# Patient Record
Sex: FEMALE | HISPANIC OR LATINO | Employment: OTHER | URBAN - METROPOLITAN AREA
[De-identification: names, ages, dates, MRNs, and addresses within clinical notes are randomized per-mention and may not be internally consistent; named-entity substitution may affect disease eponyms.]

---

## 2017-12-28 ENCOUNTER — APPOINTMENT (OUTPATIENT)
Dept: CT IMAGING | Facility: CLINIC | Age: 57
End: 2017-12-28
Attending: EMERGENCY MEDICINE

## 2017-12-28 ENCOUNTER — APPOINTMENT (OUTPATIENT)
Dept: ULTRASOUND IMAGING | Facility: CLINIC | Age: 57
End: 2017-12-28
Attending: EMERGENCY MEDICINE

## 2017-12-28 ENCOUNTER — HOSPITAL ENCOUNTER (EMERGENCY)
Facility: CLINIC | Age: 57
Discharge: HOME | End: 2017-12-29
Attending: EMERGENCY MEDICINE | Admitting: EMERGENCY MEDICINE

## 2017-12-28 DIAGNOSIS — R10.13 DYSPEPSIA: ICD-10-CM

## 2017-12-28 LAB
ALBUMIN SERPL-MCNC: 3.8 G/DL (ref 3.4–5)
ALBUMIN UR-MCNC: NEGATIVE MG/DL
ALP SERPL-CCNC: 144 U/L (ref 40–150)
ALT SERPL W P-5'-P-CCNC: 37 U/L (ref 0–50)
ANION GAP SERPL CALCULATED.3IONS-SCNC: 8 MMOL/L (ref 3–14)
APPEARANCE UR: CLEAR
AST SERPL W P-5'-P-CCNC: 19 U/L (ref 0–45)
BACTERIA #/AREA URNS HPF: ABNORMAL /HPF
BASOPHILS # BLD AUTO: 0 10E9/L (ref 0–0.2)
BASOPHILS NFR BLD AUTO: 0.6 %
BILIRUB SERPL-MCNC: 0.5 MG/DL (ref 0.2–1.3)
BILIRUB UR QL STRIP: NEGATIVE
BUN SERPL-MCNC: 16 MG/DL (ref 7–30)
CALCIUM SERPL-MCNC: 9 MG/DL (ref 8.5–10.1)
CHLORIDE SERPL-SCNC: 101 MMOL/L (ref 94–109)
CO2 SERPL-SCNC: 26 MMOL/L (ref 20–32)
COLOR UR AUTO: ABNORMAL
CREAT SERPL-MCNC: 0.57 MG/DL (ref 0.52–1.04)
DIFFERENTIAL METHOD BLD: NORMAL
EOSINOPHIL # BLD AUTO: 0.2 10E9/L (ref 0–0.7)
EOSINOPHIL NFR BLD AUTO: 3.2 %
ERYTHROCYTE [DISTWIDTH] IN BLOOD BY AUTOMATED COUNT: 12.4 % (ref 10–15)
GFR SERPL CREATININE-BSD FRML MDRD: >90 ML/MIN/1.7M2
GLUCOSE SERPL-MCNC: 377 MG/DL (ref 70–99)
GLUCOSE UR STRIP-MCNC: >1000 MG/DL
HCT VFR BLD AUTO: 35.1 % (ref 35–47)
HGB BLD-MCNC: 12 G/DL (ref 11.7–15.7)
HGB UR QL STRIP: NEGATIVE
IMM GRANULOCYTES # BLD: 0 10E9/L (ref 0–0.4)
IMM GRANULOCYTES NFR BLD: 0.2 %
KETONES UR STRIP-MCNC: 10 MG/DL
LEUKOCYTE ESTERASE UR QL STRIP: NEGATIVE
LIPASE SERPL-CCNC: 477 U/L (ref 73–393)
LYMPHOCYTES # BLD AUTO: 2.1 10E9/L (ref 0.8–5.3)
LYMPHOCYTES NFR BLD AUTO: 38.6 %
MCH RBC QN AUTO: 28.4 PG (ref 26.5–33)
MCHC RBC AUTO-ENTMCNC: 34.2 G/DL (ref 31.5–36.5)
MCV RBC AUTO: 83 FL (ref 78–100)
MONOCYTES # BLD AUTO: 0.3 10E9/L (ref 0–1.3)
MONOCYTES NFR BLD AUTO: 4.9 %
NEUTROPHILS # BLD AUTO: 2.8 10E9/L (ref 1.6–8.3)
NEUTROPHILS NFR BLD AUTO: 52.5 %
NITRATE UR QL: NEGATIVE
NRBC # BLD AUTO: 0 10*3/UL
NRBC BLD AUTO-RTO: 0 /100
PH UR STRIP: 5.5 PH (ref 5–7)
PLATELET # BLD AUTO: 219 10E9/L (ref 150–450)
POTASSIUM SERPL-SCNC: 3.8 MMOL/L (ref 3.4–5.3)
PROT SERPL-MCNC: 7.3 G/DL (ref 6.8–8.8)
RBC # BLD AUTO: 4.22 10E12/L (ref 3.8–5.2)
RBC #/AREA URNS AUTO: 2 /HPF (ref 0–2)
SODIUM SERPL-SCNC: 135 MMOL/L (ref 133–144)
SOURCE: ABNORMAL
SP GR UR STRIP: 1.03 (ref 1–1.03)
SQUAMOUS #/AREA URNS AUTO: <1 /HPF (ref 0–1)
UROBILINOGEN UR STRIP-MCNC: NORMAL MG/DL (ref 0–2)
WBC # BLD AUTO: 5.3 10E9/L (ref 4–11)
WBC #/AREA URNS AUTO: 6 /HPF (ref 0–2)

## 2017-12-28 PROCEDURE — 76705 ECHO EXAM OF ABDOMEN: CPT

## 2017-12-28 PROCEDURE — 25000125 ZZHC RX 250: Performed by: EMERGENCY MEDICINE

## 2017-12-28 PROCEDURE — 81001 URINALYSIS AUTO W/SCOPE: CPT | Performed by: EMERGENCY MEDICINE

## 2017-12-28 PROCEDURE — 25000132 ZZH RX MED GY IP 250 OP 250 PS 637: Performed by: EMERGENCY MEDICINE

## 2017-12-28 PROCEDURE — 80053 COMPREHEN METABOLIC PANEL: CPT | Performed by: EMERGENCY MEDICINE

## 2017-12-28 PROCEDURE — 99285 EMERGENCY DEPT VISIT HI MDM: CPT | Mod: 25 | Performed by: EMERGENCY MEDICINE

## 2017-12-28 PROCEDURE — 74177 CT ABD & PELVIS W/CONTRAST: CPT

## 2017-12-28 PROCEDURE — 83690 ASSAY OF LIPASE: CPT | Performed by: EMERGENCY MEDICINE

## 2017-12-28 PROCEDURE — 99285 EMERGENCY DEPT VISIT HI MDM: CPT | Mod: Z6 | Performed by: EMERGENCY MEDICINE

## 2017-12-28 PROCEDURE — 25000128 H RX IP 250 OP 636: Performed by: EMERGENCY MEDICINE

## 2017-12-28 PROCEDURE — 85025 COMPLETE CBC W/AUTO DIFF WBC: CPT | Performed by: EMERGENCY MEDICINE

## 2017-12-28 PROCEDURE — 96360 HYDRATION IV INFUSION INIT: CPT | Performed by: EMERGENCY MEDICINE

## 2017-12-28 RX ORDER — SODIUM CHLORIDE 9 MG/ML
1000 INJECTION, SOLUTION INTRAVENOUS CONTINUOUS
Status: DISCONTINUED | OUTPATIENT
Start: 2017-12-28 | End: 2017-12-29 | Stop reason: HOSPADM

## 2017-12-28 RX ORDER — IOPAMIDOL 755 MG/ML
100 INJECTION, SOLUTION INTRAVASCULAR ONCE
Status: COMPLETED | OUTPATIENT
Start: 2017-12-28 | End: 2017-12-28

## 2017-12-28 RX ORDER — CALCIUM CARBONATE 500(1250)
1 TABLET ORAL 2 TIMES DAILY
COMMUNITY

## 2017-12-28 RX ORDER — PANTOPRAZOLE SODIUM 40 MG/1
40 TABLET, DELAYED RELEASE ORAL DAILY
Qty: 30 TABLET | Refills: 0 | Status: SHIPPED | OUTPATIENT
Start: 2017-12-28 | End: 2018-01-27

## 2017-12-28 RX ADMIN — IOPAMIDOL 81 ML: 755 INJECTION, SOLUTION INTRAVENOUS at 22:32

## 2017-12-28 RX ADMIN — LIDOCAINE HYDROCHLORIDE 30 ML: 20 SOLUTION ORAL; TOPICAL at 23:08

## 2017-12-28 RX ADMIN — SODIUM CHLORIDE 1000 ML: 9 INJECTION, SOLUTION INTRAVENOUS at 22:48

## 2017-12-28 RX ADMIN — SODIUM CHLORIDE 60 ML: 9 INJECTION, SOLUTION INTRAVENOUS at 22:33

## 2017-12-28 ASSESSMENT — ENCOUNTER SYMPTOMS
EYE REDNESS: 0
ABDOMINAL PAIN: 1
SHORTNESS OF BREATH: 0
NECK STIFFNESS: 0
DIFFICULTY URINATING: 0
HEADACHES: 0
FEVER: 0
ARTHRALGIAS: 0
CONFUSION: 0
COLOR CHANGE: 0

## 2017-12-28 NOTE — ED AVS SNAPSHOT
[facility], Emergency Department    [address] [place] [address]    Phone:  [phone]    Fax:  [phone]                                       Melba Rivera   MRN: [id number]    Department:  [facility], Emergency Department   Date of Visit:  12/28/2017           Patient Information     Date Of Birth          1960        Your diagnoses for this visit were:     Dyspepsia        You were seen by Nam Mckeon MD.        Discharge Instructions         Start protonix as directed  Your ultrasound is normal  Your CT scan is normal    Dolor epigástrico, causa incierta [Epigastric Pain, Uncertain Cause]  El dolor epigástrico puede ser un signo de enfermedad localizada en la parte superior del abdomen. Las causas frecuentes incluyen:       Reflujo ácido (el ácido del estómago sube hacia el esófago)    Gastritis (irritación del revestimiento del estómago)    Úlcera péptica    Inflamación del páncreas    Cálculos (piedras) biliares    Infección en la vesícula biliar  El dolor puede ser sordo o quemante. Puede irradiarse hacia el pecho o la espalda. Puede neo otros síntomas noemi eructos, sensación de inflamiento en el estómago, cólicos o dolor de hambre. Puede neo pérdida de peso o poco apetito, náuseas o vómito.  Puesto que el diagnóstico de allison dolor es incierto, algunas veces se necesitarán pruebas adicionales. En algunos casos el médico tratará la enfermedad más probable para gerald si mejora antes de hacer pruebas adicionales.  Cuidados en el hogar  Medicamentos    Los antiácidos ayudan a neutralizar los ácidos normales en el estómago. Ejemplos de estos son Maalox, Mylanta, Rolaids y Tums. Si no le gusta el líquido, puede ensayar las formas masticables. Es posible que note que algunos le funcionan mejor que otros. El uso excesivo puede provocar diarrea o estreñimiento.    Los bloqueadores de ácido (bloqueadores H2) disminuyen la producción de ácido. Ejemplos de esto son cimetidina  (Tagamet), famotidina (Pepcid) y ranitidina (Zantac).    Los inhibidores la bomba de protones (PPI) disminuyen la producción de ácido de demetris manera diferente que los bloqueadores. Usted puede encontrar que funcionan mejor, bebeto tardan un poco más en surtir efecto. Ejemplos de estos son omeprazole (Prilosec), lansoprazole (Prevacid), rabeprazole (Aciphex) y esomeprazole (Nexium).    [place] un antiácido de 30 a 60 minutos después de comer y a la hora de acostarse, bebeto no a la misma hora que un bloqueador de ácido (de la bomba de protones).    Trate de no laura antiinflamatorios no esteroides (CATHLEEN). La aspirina también puede provocar problemas, bebeto si la clare para el corazón u otra razón médica, hable con allison médico antes de suspenderla; usted no quisiera causar un problema peor, noemi un ataque cardíaco o un ataque cerebral.  Dieta    Si ciertos alientos parecen provocar el espasmo, trate de evitarlos.    Coma despacio y mastique zandra los alimentos antes de tragarlos. Los síntomas de la gastritis pueden empeorar con ciertas comidas. Limite las comidas grasosas, fritas y muy condimentadas, así noemi el café, el chocolate, las mentas y los alientos con alto contenido de ácido noemi los tomates y las frutas y jugo cítricos (naranja, toronja, josé luis).    Evite el alcohol, la cafeína y el tabaco, los cuales pueden retrasar la sanación y empeorar allison problema.    Trate de comer comidas pequeñas con bocadillos entre demetris y otra.  Cuidados de seguimiento  Mariana un seguimiento con allison proveedor de atención médica, o noemi le indiquen.  Cuándo buscar consejo médico  Llame de inmediato a allison proveedor de atención médica si algo de lo siguiente ocurre:    El dolor de estómago empeora o se mueve hacia la parte derecha inferior del abdomen    Aparece dolor de pecho, o si el dolor empeora o se extiende hacia el pecho, la espalda, el demi, el hombro o el brazo    Vómito frecuente (no puede retener los líquidos en allison estómago)    [place] en  las heces o en el vómito (color rojizo o negruzco)    Siente debilidad o mareos, se desmaya o tiene problemas para respirar    Fiebre de 100.4 F (38 C) o más florecita, o noemi le haya indicado allison proveedor de atención médica.    Hinchazón abdominal     Date Last Reviewed: 9/25/2015 2000-2017 [employer]. [address] [place] [address], [place] [address]. Todos los derechos reservados. Esta información no pretende sustituir la atención médica profesional. Sólo allison médico puede diagnosticar y tratar un problema de gayatri.          24 Hour Appointment Hotline       To make an appointment at any [facility] clinic, call [phone] ([phone]). If you don't have a family doctor or clinic, we will help you find one. [facility] clinics are conveniently located to serve the needs of you and your family.             Review of your medicines      START taking        Dose / Directions Last dose taken    pantoprazole 40 MG EC tablet   Commonly known as:  PROTONIX   Dose:  40 mg   Quantity:  30 tablet        Take 1 tablet (40 mg) by mouth daily for 30 doses   Refills:  0          Our records show that you are taking the medicines listed below. If these are incorrect, please call your family doctor or clinic.        Dose / Directions Last dose taken    ATORVASTATIN CALCIUM PO   Dose:  20 mg        Take 20 mg by mouth daily   Refills:  0        calcium carbonate 1250 MG tablet   Commonly known as:  OS-CASSANDRA 500 mg [place]   Dose:  1 tablet        Take 1 tablet by mouth 2 times daily   Refills:  0        METFORMIN HCL PO   Dose:  1000 mg        Take 1,000 mg by mouth 2 times daily (with meals)   Refills:  0                Prescriptions were sent or printed at these locations (1 Prescription)                   Other Prescriptions                Printed at Department/Unit printer (1 of 1)         pantoprazole (PROTONIX) 40 MG EC tablet                Procedures and tests performed during your visit     CBC with platelets  "differential    CT Abdomen Pelvis w Contrast    Comprehensive metabolic panel    Lipase    UA with Microscopic    US Abdomen Limited      Orders Needing Specimen Collection     None      Pending Results     Date and Time Order Name Status Description    2017 2141 US Abdomen Limited Preliminary             Pending Culture Results     No orders found for last 3 day(s).            Pending Results Instructions     If you had any lab results that were not finalized at the time of your Discharge, you can call the ED Lab Result RN at [phone]. You will be contacted by this team for any positive Lab results or changes in treatment. The nurses are available 7 days a week from 10A to 6:30P.  You can leave a message 24 hours per day and they will return your call.        Thank you for choosing [facility]       Thank you for choosing [facility] for your care. Our goal is always to provide you with excellent care. Hearing back from our patients is one way we can continue to improve our services. Please take a few minutes to complete the written survey that you may receive in the mail after you visit with us. Thank you!        [employer] Information     [employer] lets you send messages to your doctor, view your test results, renew your prescriptions, schedule appointments and more. To sign up, go to [email or web address] . Click on \"Log in\" on the left side of the screen, which will take you to the Welcome page. Then click on \"Sign up Now\" on the right side of the page.     You will be asked to enter the access code listed below, as well as some personal information. Please follow the directions to create your username and password.     Your access code is: [email or web address]  Expires: 3/29/2018 12:10 AM     Your access code will  in 90 days. If you need help or a new code, please call your [facility] clinic or [phone].        Care EveryWhere ID     This is your Care EveryWhere ID. This could be used by other organizations " to access your [place] medical records  [id number]        Equal Access to Services     SHAY SUTTON : Coni Andrade, shantell culp, booker morgan. So [facility] [phone].    ATENCIÓN: Si habla español, tiene a allison disposición servicios gratuitos de asistencia lingüística. Llame al [phone].    We comply with applicable federal civil rights laws and [place] laws. We do not discriminate on the basis of race, color, national origin, age, disability, sex, sexual orientation, or gender identity.            After Visit Summary       This is your record. Keep this with you and show to your community pharmacist(s) and doctor(s) at your next visit.

## 2017-12-28 NOTE — ED AVS SNAPSHOT
[facility] [place], Emergency Department    [address]    [facility] [place] [address]    Phone:  [phone]    Fax:  [phone]                                       Melba Rivera   MRN: [id number]    Department:  [facility], Emergency Department   Date of Visit:  12/28/2017           After Visit Summary Signature Page     I have received my discharge instructions, and my questions have been answered. I have discussed any challenges I see with this plan with the nurse or doctor.    ..........................................................................................................................................  Patient/Patient Representative Signature      ..........................................................................................................................................  Patient Representative Print Name and Relationship to Patient    ..................................................               ................................................  Date                                            Time    ..........................................................................................................................................  Reviewed by Signature/Title    ...................................................              ..............................................  Date                                                            Time

## 2017-12-29 VITALS
WEIGHT: 165 LBS | RESPIRATION RATE: 17 BRPM | SYSTOLIC BLOOD PRESSURE: 138 MMHG | TEMPERATURE: 97.5 F | DIASTOLIC BLOOD PRESSURE: 82 MMHG | OXYGEN SATURATION: 96 %

## 2017-12-29 NOTE — DISCHARGE INSTRUCTIONS
Start protonix as directed  Your ultrasound is normal  Your CT scan is normal    Dolor epigástrico, causa incierta [Epigastric Pain, Uncertain Cause]  El dolor epigástrico puede ser un signo de enfermedad localizada en la parte superior del abdomen. Las causas frecuentes incluyen:       Reflujo ácido (el ácido del estómago sube hacia el esófago)    Gastritis (irritación del revestimiento del estómago)    Úlcera péptica    Inflamación del páncreas    Cálculos (piedras) biliares    Infección en la vesícula biliar  El dolor puede ser sordo o quemante. Puede irradiarse hacia el pecho o la espalda. Puede neo otros síntomas noemi eructos, sensación de inflamiento en el estómago, cólicos o dolor de hambre. Puede neo pérdida de peso o poco apetito, náuseas o vómito.  Puesto que el diagnóstico de allison dolor es incierto, algunas veces se necesitarán pruebas adicionales. En algunos casos el médico tratará la enfermedad más probable para gerald si mejora antes de hacer pruebas adicionales.  Cuidados en el hogar  Medicamentos    Los antiácidos ayudan a neutralizar los ácidos normales en el estómago. Ejemplos de estos son Maalox, Mylanta, Rolaids y Tums. Si no le gusta el líquido, puede ensayar las formas masticables. Es posible que note que algunos le funcionan mejor que otros. El uso excesivo puede provocar diarrea o estreñimiento.    Los bloqueadores de ácido (bloqueadores H2) disminuyen la producción de ácido. Ejemplos de esto son cimetidina (Tagamet), famotidina (Pepcid) y ranitidina (Zantac).    Los inhibidores la bomba de protones (PPI) disminuyen la producción de ácido de demetris manera diferente que los bloqueadores. Usted puede encontrar que funcionan mejor, bebeto tardan un poco más en surtir efecto. Ejemplos de estos son omeprazole (Prilosec), lansoprazole (Prevacid), rabeprazole (Aciphex) y esomeprazole (Nexium).    [place] un antiácido de 30 a 60 minutos después de comer y a la hora de acostarse, bebeto no a la misma hora que  un bloqueador de ácido (de la bomba de protones).    Trate de no laura antiinflamatorios no esteroides (CATHLEEN). La aspirina también puede provocar problemas, bebeto si la clare para el corazón u otra razón médica, hable con allison médico antes de suspenderla; usted no quisiera causar un problema peor, noemi un ataque cardíaco o un ataque cerebral.  Dieta    Si ciertos alientos parecen provocar el espasmo, trate de evitarlos.    Coma despacio y mastique zandra los alimentos antes de tragarlos. Los síntomas de la gastritis pueden empeorar con ciertas comidas. Limite las comidas grasosas, fritas y muy condimentadas, así noemi el café, el chocolate, las mentas y los alientos con alto contenido de ácido noemi los tomates y las frutas y jugo cítricos (naranja, toronja, josé luis).    Evite el alcohol, la cafeína y el tabaco, los cuales pueden retrasar la sanación y empeorar allison problema.    Trate de comer comidas pequeñas con bocadillos entre demetris y otra.  Cuidados de seguimiento  Mariana un seguimiento con allison proveedor de atención médica, o noemi le indiquen.  Cuándo buscar consejo médico  Llame de inmediato a allison proveedor de atención médica si algo de lo siguiente ocurre:    El dolor de estómago empeora o se mueve hacia la parte derecha inferior del abdomen    Aparece dolor de pecho, o si el dolor empeora o se extiende hacia el pecho, la espalda, el demi, el hombro o el brazo    Vómito frecuente (no puede retener los líquidos en allison estómago)    [place] en las heces o en el vómito (color rojizo o negruzco)    Siente debilidad o mareos, se desmaya o tiene problemas para respirar    Fiebre de 100.4 F (38 C) o más florecita, o noemi le haya indicado allison proveedor de atención médica.    Hinchazón abdominal     Date Last Reviewed: 9/25/2015 2000-2017 [employer]. [address] [place] [address], [place] [address]. Todos los derechos reservados. Esta información no pretende sustituir la atención médica profesional. Sólo allison médico puede  diagnosticar y tratar un problema de gayatri.

## 2017-12-29 NOTE — ED PROVIDER NOTES
"  History     Chief Complaint   Patient presents with     Abdominal Pain     reported abdominal pain started 2-3 weeks ago. Also c/o nausea denies episode of vomiting.      The history is provided by the patient. The history is limited by a language barrier. A  was used.     Melba Rivera is a 57 year old female with history of diabetes who presents to the ED for the evaluation of LUQ/epigastric abdominal pain. The patient states that this pain has been present for the past three weeks, and though she has been seen and prescribed some type of medication, she hasn't had any raeleif. She states that this pain waxes and wanes, but at times it is severe enough to make her \"feel disabled.\" The patient is ordinally from [facility], and she has only been in [place] for 3 months now. She notes that other than a hysterectomy and  about 15 years ago, she hasn't underwent any other abdominal surgeries. The patient's history is severely limited by a language barrier - though an  was used, there was still ambiguity and uncertainty in the history.        PAST MEDICAL HISTORY: History reviewed. No pertinent past medical history.    PAST SURGICAL HISTORY:   Past Surgical History:   Procedure Laterality Date     GYN SURGERY       17 years ago       FAMILY HISTORY: No family history on file.    SOCIAL HISTORY:   Social History   Substance Use Topics     Smoking status: Never Smoker     Smokeless tobacco: Never Used     Alcohol use No       Patient's Medications   New Prescriptions    PANTOPRAZOLE (PROTONIX) 40 MG EC TABLET    Take 1 tablet (40 mg) by mouth daily for 30 doses   Previous Medications    ATORVASTATIN CALCIUM PO    Take 20 mg by mouth daily    CALCIUM CARBONATE (OS-CASSANDRA 500 MG [place]. [place]) 1250 MG TABLET    Take 1 tablet by mouth 2 times daily    METFORMIN HCL PO    Take 1,000 mg by mouth 2 times daily (with meals)   Modified Medications    No medications on file   Discontinued " Medications    No medications on file        No Known Allergies      I have reviewed the Medications, Allergies, Past Medical and Surgical History, and Social History in the Epic system.    Review of Systems   Constitutional: Negative for fever.   HENT: Negative for congestion.    Eyes: Negative for redness.   Respiratory: Negative for shortness of breath.    Cardiovascular: Negative for chest pain.   Gastrointestinal: Positive for abdominal pain (burning).   Genitourinary: Negative for difficulty urinating.   Musculoskeletal: Negative for arthralgias and neck stiffness.   Skin: Negative for color change.   Neurological: Negative for headaches.   Psychiatric/Behavioral: Negative for confusion.   All other systems reviewed and are negative.      Physical Exam   BP: 151/77  Heart Rate: 62  Temp: 96.5  F (35.8  C)  Resp: 17  Height:  (Pt matthews not know her ht. )  Weight: 74.8 kg (165 lb)  SpO2: 97 %      Physical Exam   Constitutional: She is oriented to person, place, and time. She appears well-developed and well-nourished. No distress.   HENT:   Mouth/Throat: Oropharynx is clear and moist.   Cardiovascular: Normal rate, regular rhythm and normal heart sounds.    Pulmonary/Chest: Effort normal and breath sounds normal.   Abdominal: Soft. She exhibits no distension. There is no tenderness. There is no rebound and no guarding.       Neurological: She is alert and oriented to person, place, and time.   Skin: Skin is warm.   Psychiatric: She has a normal mood and affect. Her behavior is normal.   Nursing note and vitals reviewed.      ED Course     ED Course     Procedures        Results for orders placed or performed during the hospital encounter of 12/28/17   CT Abdomen Pelvis w Contrast    Narrative    CT ABDOMEN AND PELVIS WITH CONTRAST 12/28/2017 10:34 PM     HISTORY: Epigastric pain.    TECHNIQUE: Volumetric acquisition through abdomen and pelvis with  IV  contrast. Isovue-370, 81mL. Radiation dose for this scan was  reduced  using automated exposure control, adjustment of the mA and/or kV  according to patient size, or iterative reconstruction technique.    COMPARISON: Right upper quadrant ultrasound dated 12/28/2017.    FINDINGS: Liver, gallbladder, spleen, pancreas, adrenal glands and  kidneys are negative.    Uterus is absent. No suspicious pelvic masses. No focal inflammatory  changes. No bowel obstruction, ascites or free air.      Impression    IMPRESSION: No acute abnormality.       KITA GROSS MD   US Abdomen Limited    Narrative    LIMITED ABDOMEN ULTRASOUND  12/28/2017 10:20 PM    CLINICAL INFORMATION: Right upper quadrant pain.     COMPARISON: None.    FINDINGS:  Limited right upper quadrant ultrasound demonstrates a negative  gallbladder. No gallstones or gallbladder wall thickening. No bile  duct dilatation. Increased liver echogenicity consistent with fatty  infiltration. Visualized portions of the  pancreas are negative. Tail  of the pancreas is obscured. The visualized portion of the right  kidney is unremarkable. No hydronephrosis on the right.      Impression    IMPRESSION:  1. No acute sonographic findings in the right upper quadrant.    2. Fatty infiltration of the liver.             Labs Ordered and Resulted from Time of ED Arrival Up to the Time of Departure from the ED   COMPREHENSIVE METABOLIC PANEL - Abnormal; Notable for the following:        Result Value    Glucose 377 (*)     All other components within normal limits   LIPASE - Abnormal; Notable for the following:     Lipase 477 (*)     All other components within normal limits   ROUTINE UA WITH MICROSCOPIC - Abnormal; Notable for the following:     Glucose Urine >1000 (*)     Ketones Urine 10 (*)     WBC Urine 6 (*)     Bacteria Urine Few (*)     All other components within normal limits   CBC WITH PLATELETS DIFFERENTIAL   FREE WATER            Assessments & Plan (with Medical Decision Making)   57 year old female [place] here with 2-3  weeks up upper abdominal discomfort described as burning. Her workup here was extensive which included a right upper quadrant ultrasound that was normal, CT of the abdomen that was normal as well. Laboratory abnormalities that were mildly elevated were lipase, probably not clinically significant, but I cannot exclude that she had a mild case of pancreatitis that is now resolving. There are no changes on her CT to suggest pancreatitis Her burning description of her symptoms is more consistent with dyspepsia. She was given a GI cocktail, we will start her on Protonix, she needs a PCP for a close follow up. I am comfortable that she does not have a surgical emergency given normal imaging, labs, and benign exam. I also do not feel that this represents a cardiac or chest pain, it is clearly abdominal discomfort. She is discharged home with a prescription of Protonix.    This part of the document was transcribed by Franklin Mera, Medical Scribe.        I have reviewed the nursing notes.    I have reviewed the findings, diagnosis, plan and need for follow up with the patient.    New Prescriptions    PANTOPRAZOLE (PROTONIX) 40 MG EC TABLET    Take 1 tablet (40 mg) by mouth daily for 30 doses       Final diagnoses:   Dyspepsia   I, Franklin Mera, am serving as a trained medical scribe to document services personally performed by Nam Mckeon MD, based on the provider's statements to me.      I, Nam Mckeon MD, was physically present and have reviewed and verified the accuracy of this note documented by Franklin Mera.       12/28/2017   [facility], [place], EMERGENCY DEPARTMENT     Nam Mckeon MD  12/29/17 0005       Nam Mckeon MD  12/29/17 0010

## 2017-12-29 NOTE — ED NOTES
#[id number].  Presents to ED for abdominal pain for the past 3 weeks.  Seen by doctor.  Was told she had gastritis.  Points to whole abdomen as to the area of pain.  Describes pain as burning.  Tender around umbilicus with palpation.

## 2021-12-13 ENCOUNTER — HOSPITAL ENCOUNTER (EMERGENCY)
Facility: CLINIC | Age: 61
Discharge: HOME | End: 2021-12-13
Attending: EMERGENCY MEDICINE | Admitting: EMERGENCY MEDICINE

## 2021-12-13 ENCOUNTER — APPOINTMENT (OUTPATIENT)
Dept: GENERAL RADIOLOGY | Facility: CLINIC | Age: 61
End: 2021-12-13
Attending: EMERGENCY MEDICINE

## 2021-12-13 ENCOUNTER — APPOINTMENT (OUTPATIENT)
Dept: ULTRASOUND IMAGING | Facility: CLINIC | Age: 61
End: 2021-12-13
Attending: EMERGENCY MEDICINE

## 2021-12-13 VITALS
HEART RATE: 74 BPM | SYSTOLIC BLOOD PRESSURE: 128 MMHG | DIASTOLIC BLOOD PRESSURE: 95 MMHG | HEIGHT: 55 IN | TEMPERATURE: 97.9 F | RESPIRATION RATE: 16 BRPM | BODY MASS INDEX: 207.9 KG/M2 | OXYGEN SATURATION: 99 %

## 2021-12-13 DIAGNOSIS — R10.11 ABDOMINAL PAIN, RIGHT UPPER QUADRANT: ICD-10-CM

## 2021-12-13 DIAGNOSIS — R07.9 CHEST PAIN: ICD-10-CM

## 2021-12-13 LAB
ALBUMIN SERPL-MCNC: 3.2 G/DL (ref 3.4–5)
ALP SERPL-CCNC: 113 U/L (ref 40–150)
ALT SERPL W P-5'-P-CCNC: 29 U/L (ref 0–50)
ANION GAP SERPL CALCULATED.3IONS-SCNC: 6 MMOL/L (ref 3–14)
AST SERPL W P-5'-P-CCNC: 16 U/L (ref 0–45)
ATRIAL RATE - MUSE: 71 BPM
BASOPHILS # BLD AUTO: 0 10E3/UL (ref 0–0.2)
BASOPHILS NFR BLD AUTO: 0 %
BILIRUB SERPL-MCNC: 0.2 MG/DL (ref 0.2–1.3)
BUN SERPL-MCNC: 18 MG/DL (ref 7–30)
CALCIUM SERPL-MCNC: 8.8 MG/DL (ref 8.5–10.1)
CHLORIDE BLD-SCNC: 109 MMOL/L (ref 94–109)
CO2 SERPL-SCNC: 25 MMOL/L (ref 20–32)
CREAT SERPL-MCNC: 0.64 MG/DL (ref 0.52–1.04)
D DIMER PPP FEU-MCNC: 0.27 UG/ML FEU (ref 0–0.5)
DIASTOLIC BLOOD PRESSURE - MUSE: NORMAL MMHG
EOSINOPHIL # BLD AUTO: 0.1 10E3/UL (ref 0–0.7)
EOSINOPHIL NFR BLD AUTO: 2 %
ERYTHROCYTE [DISTWIDTH] IN BLOOD BY AUTOMATED COUNT: 13.4 % (ref 10–15)
GFR SERPL CREATININE-BSD FRML MDRD: >90 ML/MIN/1.73M2
GLUCOSE BLD-MCNC: 141 MG/DL (ref 70–99)
HCT VFR BLD AUTO: 32.1 % (ref 35–47)
HGB BLD-MCNC: 10.2 G/DL (ref 11.7–15.7)
HOLD SPECIMEN: NORMAL
IMM GRANULOCYTES # BLD: 0 10E3/UL
IMM GRANULOCYTES NFR BLD: 0 %
INTERPRETATION ECG - MUSE: NORMAL
LIPASE SERPL-CCNC: 176 U/L (ref 73–393)
LYMPHOCYTES # BLD AUTO: 1.5 10E3/UL (ref 0.8–5.3)
LYMPHOCYTES NFR BLD AUTO: 30 %
MCH RBC QN AUTO: 26.7 PG (ref 26.5–33)
MCHC RBC AUTO-ENTMCNC: 31.8 G/DL (ref 31.5–36.5)
MCV RBC AUTO: 84 FL (ref 78–100)
MONOCYTES # BLD AUTO: 0.4 10E3/UL (ref 0–1.3)
MONOCYTES NFR BLD AUTO: 7 %
NEUTROPHILS # BLD AUTO: 2.9 10E3/UL (ref 1.6–8.3)
NEUTROPHILS NFR BLD AUTO: 61 %
NRBC # BLD AUTO: 0 10E3/UL
NRBC BLD AUTO-RTO: 0 /100
P AXIS - MUSE: 16 DEGREES
PLATELET # BLD AUTO: 256 10E3/UL (ref 150–450)
POTASSIUM BLD-SCNC: 3.9 MMOL/L (ref 3.4–5.3)
PR INTERVAL - MUSE: 156 MS
PROT SERPL-MCNC: 7 G/DL (ref 6.8–8.8)
QRS DURATION - MUSE: 92 MS
QT - MUSE: 418 MS
QTC - MUSE: 454 MS
R AXIS - MUSE: -26 DEGREES
RBC # BLD AUTO: 3.82 10E6/UL (ref 3.8–5.2)
SODIUM SERPL-SCNC: 140 MMOL/L (ref 133–144)
SYSTOLIC BLOOD PRESSURE - MUSE: NORMAL MMHG
T AXIS - MUSE: 15 DEGREES
TROPONIN I SERPL HS-MCNC: 7 NG/L
TROPONIN I SERPL HS-MCNC: 9 NG/L
VENTRICULAR RATE- MUSE: 71 BPM
WBC # BLD AUTO: 4.9 10E3/UL (ref 4–11)

## 2021-12-13 PROCEDURE — 83690 ASSAY OF LIPASE: CPT | Performed by: EMERGENCY MEDICINE

## 2021-12-13 PROCEDURE — 250N000009 HC RX 250: Performed by: EMERGENCY MEDICINE

## 2021-12-13 PROCEDURE — 84484 ASSAY OF TROPONIN QUANT: CPT | Mod: 91 | Performed by: EMERGENCY MEDICINE

## 2021-12-13 PROCEDURE — 76705 ECHO EXAM OF ABDOMEN: CPT

## 2021-12-13 PROCEDURE — 99285 EMERGENCY DEPT VISIT HI MDM: CPT | Mod: 25 | Performed by: EMERGENCY MEDICINE

## 2021-12-13 PROCEDURE — 36415 COLL VENOUS BLD VENIPUNCTURE: CPT | Performed by: EMERGENCY MEDICINE

## 2021-12-13 PROCEDURE — 93010 ELECTROCARDIOGRAM REPORT: CPT | Performed by: EMERGENCY MEDICINE

## 2021-12-13 PROCEDURE — 85379 FIBRIN DEGRADATION QUANT: CPT | Performed by: EMERGENCY MEDICINE

## 2021-12-13 PROCEDURE — 71046 X-RAY EXAM CHEST 2 VIEWS: CPT

## 2021-12-13 PROCEDURE — 36415 COLL VENOUS BLD VENIPUNCTURE: CPT

## 2021-12-13 PROCEDURE — 250N000013 HC RX MED GY IP 250 OP 250 PS 637: Performed by: EMERGENCY MEDICINE

## 2021-12-13 PROCEDURE — 84484 ASSAY OF TROPONIN QUANT: CPT

## 2021-12-13 PROCEDURE — 93005 ELECTROCARDIOGRAM TRACING: CPT | Performed by: EMERGENCY MEDICINE

## 2021-12-13 PROCEDURE — 82247 BILIRUBIN TOTAL: CPT | Performed by: EMERGENCY MEDICINE

## 2021-12-13 PROCEDURE — 82040 ASSAY OF SERUM ALBUMIN: CPT | Performed by: EMERGENCY MEDICINE

## 2021-12-13 PROCEDURE — 85025 COMPLETE CBC W/AUTO DIFF WBC: CPT | Performed by: EMERGENCY MEDICINE

## 2021-12-13 PROCEDURE — 84484 ASSAY OF TROPONIN QUANT: CPT | Performed by: EMERGENCY MEDICINE

## 2021-12-13 RX ORDER — LEVOTHYROXINE SODIUM 50 UG/1
50 TABLET ORAL DAILY
COMMUNITY

## 2021-12-13 RX ORDER — NICOTINE POLACRILEX 4 MG/1
20 GUM, CHEWING ORAL DAILY
COMMUNITY
End: 2024-05-06

## 2021-12-13 RX ORDER — GLIPIZIDE 10 MG/1
10 TABLET, FILM COATED, EXTENDED RELEASE ORAL DAILY
COMMUNITY

## 2021-12-13 RX ADMIN — LIDOCAINE HYDROCHLORIDE 30 ML: 20 SOLUTION OROPHARYNGEAL at 12:21

## 2021-12-13 ASSESSMENT — ENCOUNTER SYMPTOMS
COUGH: 0
SORE THROAT: 0
FACIAL ASYMMETRY: 0
PALPITATIONS: 0
BACK PAIN: 0
NAUSEA: 0
TREMORS: 0
COLOR CHANGE: 0
BLOOD IN STOOL: 0
STRIDOR: 0
EYE DISCHARGE: 0
NUMBNESS: 0
CHEST TIGHTNESS: 1
CHILLS: 0
SHORTNESS OF BREATH: 0
FEVER: 0
ABDOMINAL DISTENTION: 0
ADENOPATHY: 0
DYSURIA: 0
FREQUENCY: 0
ABDOMINAL PAIN: 1
VOMITING: 0
FATIGUE: 0
DIARRHEA: 0
CONSTIPATION: 0
RHINORRHEA: 0
EYE REDNESS: 0
DIZZINESS: 0
HEADACHES: 0
EYE PAIN: 0
HEMATURIA: 0

## 2021-12-13 NOTE — ED PROVIDER NOTES
ED Provider Note  [facility]      History     Chief Complaint   Patient presents with     Chest Pain     HPI  Melba Rivera is a 61 year old female with a hx of DM, PU, and hypothyroidism who came to ED for CP started 1 hour ago. She provides information through his son as she speaks only [place]. CP was severe in its start and decreased now. Pain is pressing the chest in its nature and does not radiate to other sites. The pain is not associated with SOB, N or V, dizziness or LOC. Pt also reports having pain on RUQ which is not new and she is experiencing it for several months, but she feels it is worse today. She olga lidia any alcohol, nicotine or drug use. She reports this as her first experience with CP and she was doing fine before the pain.    Past Medical History  History reviewed. No pertinent past medical history.  Past Surgical History:   Procedure Laterality Date     GYN SURGERY       17 years ago     ATORVASTATIN CALCIUM PO  calcium carbonate (OS-CASSANDRA 500 MG [place]. [place]) 1250 MG tablet  glipiZIDE (GLUCOTROL XL) 10 MG 24 hr tablet  levothyroxine (SYNTHROID/LEVOTHROID) 50 MCG tablet  METFORMIN HCL PO  omeprazole 20 MG tablet      No Known Allergies  Family History  History reviewed. No pertinent family history.  Social History   Social History     Tobacco Use     Smoking status: Never Smoker     Smokeless tobacco: Never Used   Substance Use Topics     Alcohol use: No     Drug use: No      Past medical history, past surgical history, medications, allergies, family history, and social history were reviewed with the patient. No additional pertinent items.       Review of Systems   Constitutional: Negative for chills, fatigue and fever.   HENT: Negative for congestion, drooling, mouth sores, rhinorrhea, sneezing and sore throat.    Eyes: Negative for pain, discharge and redness.   Respiratory: Positive for chest tightness. Negative for cough, shortness of breath and stridor.   "  Cardiovascular: Positive for chest pain. Negative for palpitations and leg swelling.   Gastrointestinal: Positive for abdominal pain. Negative for abdominal distention, blood in stool, constipation, diarrhea, nausea and vomiting.   Genitourinary: Negative for dysuria, frequency, hematuria and pelvic pain.   Musculoskeletal: Negative for back pain.   Skin: Negative for color change.   Neurological: Negative for dizziness, tremors, facial asymmetry, numbness and headaches.   Hematological: Negative for adenopathy.     A complete review of systems was performed with pertinent positives and negatives noted in the HPI, and all other systems negative.    Physical Exam   BP: (!) 138/98  Pulse: 74  Temp: 97.9  F (36.6  C)  Resp: 16  Height: 60 cm (1' 11.62\")  SpO2: 96 %  Physical Exam  Constitutional:       Appearance: She is obese. She is not toxic-appearing or diaphoretic.   HENT:      Head: Normocephalic and atraumatic.      Mouth/Throat:      Mouth: Mucous membranes are moist.      Pharynx: Oropharynx is clear.   Eyes:      General: No scleral icterus.     Extraocular Movements: Extraocular movements intact.      Pupils: Pupils are equal, round, and reactive to light.   Neck:      Thyroid: No thyromegaly.      Vascular: No JVD.   Cardiovascular:      Rate and Rhythm: Normal rate and regular rhythm.   Pulmonary:      Effort: Pulmonary effort is normal.   Abdominal:      Tenderness: There is abdominal tenderness in the right upper quadrant. There is no guarding or rebound.   Musculoskeletal:      Cervical back: Normal range of motion and neck supple.   Lymphadenopathy:      Cervical: No cervical adenopathy.   Skin:     General: Skin is warm.      Coloration: Skin is not cyanotic or jaundiced.   Neurological:      General: No focal deficit present.      Mental Status: She is alert and oriented to person, place, and time.       ED Course     ED Course as of 12/13/21 1746   Mon Dec 13, 2021   1206 Troponin I "     Procedures       The medical record was reviewed and interpreted.  Current labs reviewed and interpreted.            EKG Interpretation:      Interpreted by Williams Alicea DO  Time reviewed:0908   Symptoms at time of EKG: chest pain   Rhythm: Normal sinus   Rate: 71  Axis: Normal  Ectopy: None  Conduction: Normal and Right bundle branch block (incomplete)  ST Segments/ T Waves: No ST-T wave changes and No acute ischemic changes  Q Waves: None  Comparison to prior: No old EKG available    Clinical Impression: right bundle branch block (incomplete)         Results for orders placed or performed during the hospital encounter of 12/13/21   EKG 12-lead, tracing only     Status: None (Preliminary result)   Result Value Ref Range    Systolic Blood Pressure  mmHg    Diastolic Blood Pressure  mmHg    Ventricular Rate 71 BPM    Atrial Rate 71 BPM    AL Interval 156 ms    QRS Duration 92 ms     ms    QTc 454 ms    P Axis 16 degrees    R AXIS -26 degrees    T Axis 15 degrees    Interpretation ECG       Sinus rhythm  Incomplete right bundle branch block  Borderline ECG     CBC with platelets differential     Status: None ()    Narrative    The following orders were created for panel order CBC with platelets differential.  Procedure                               Abnormality         Status                     ---------                               -----------         ------                     CBC with platelets and d...[[id number]]                                                   Please view results for these tests on the individual orders.     Medications - No data to display     Assessments & Plan (with Medical Decision Making)   Melba Rivera is a 61 year old female with a hx of DM, PU, and hypothyroidism who came to ED for CP started 1 hour ago. In physical examinations, her RUQ is tender otherwise her exam seems wnl. In DDx, CAD, PE, pericarditis, pneumothorax, pleuritis, PU, cholelithiasis and cholecystitis  should be considered.  ECG shows no acute abnormalities.  Lab work including troponin show no acute abnormalities.  Chest x-ray and right upper quadrant ultrasound show no acute abnormalities.  Troponin was ordered which was still within normal limits and did not significantly increase.  D-dimer is not elevated.  Patient was given GI cocktail and had full resolution of symptoms.  I discussed all results with patient discussed that that we cannot completely rule out cardiac etiology suspicion is lower in light of current findings.  Patient has a primary care physician that she can follow-up with.  Patient opts for discharge and follow-up with PCP rather than Observation. Will discharge home with return precautions. Discussed reasons to return to the emergency department.  Patient understands and agrees with this plan.    I have reviewed the nursing notes. I have reviewed the findings, diagnosis, plan and need for follow up with the patient.    New Prescriptions    No medications on file       Final diagnoses:   None     --    ED Attending Physician Attestation    I Williams Alicea DO, cared for this patient with the Resident. I have performed a history and physical examination of the patient and discussed management with the resident. I reviewed the resident's documentation above and agree with the documented findings and plan of care.    Summary of HPI, PE, ED Course   Patient is a 61 year old female evaluated in the emergency department for chest pain. Exam notable for RUQ tenderness. ED course notable for negative lab and imaging work-up. After the completion of care in the emergency department, the patient was discharged.    Critical Care & Procedures  Not applicable.    Medical Decision Making  The medical record was reviewed and interpreted.  Current labs reviewed and interpreted.  Current images reviewed and interpreted: CXR.  EKG reviewed and interpreted: NSR.      Williams Alicea DO  Emergency Medicine        Liya Garcia MD, PGY1    --  Williams Alicea DO  formerly [facility] EMERGENCY DEPARTMENT  12/13/2021     Williams Alicea DO  12/13/21 1756

## 2021-12-13 NOTE — ED TRIAGE NOTES
Pt comes in for left sided chest pain radiating to abdomen, rating 5/10 at this time. Reports sudden shortness of breath and 10/10 pain while at home. Reports diabetes, denies known cardiac hx.

## 2022-01-29 ENCOUNTER — HEALTH MAINTENANCE LETTER (OUTPATIENT)
Age: 62
End: 2022-01-29

## 2022-09-17 ENCOUNTER — HEALTH MAINTENANCE LETTER (OUTPATIENT)
Age: 62
End: 2022-09-17

## 2023-05-06 ENCOUNTER — HEALTH MAINTENANCE LETTER (OUTPATIENT)
Age: 63
End: 2023-05-06

## 2023-07-30 ENCOUNTER — HEALTH MAINTENANCE LETTER (OUTPATIENT)
Age: 63
End: 2023-07-30

## 2024-02-25 ENCOUNTER — HEALTH MAINTENANCE LETTER (OUTPATIENT)
Age: 64
End: 2024-02-25

## 2024-05-06 ENCOUNTER — HOSPITAL ENCOUNTER (EMERGENCY)
Facility: CLINIC | Age: 64
Discharge: HOME | End: 2024-05-06
Attending: STUDENT IN AN ORGANIZED HEALTH CARE EDUCATION/TRAINING PROGRAM | Admitting: STUDENT IN AN ORGANIZED HEALTH CARE EDUCATION/TRAINING PROGRAM
Payer: COMMERCIAL

## 2024-05-06 ENCOUNTER — APPOINTMENT (OUTPATIENT)
Dept: ULTRASOUND IMAGING | Facility: CLINIC | Age: 64
End: 2024-05-06
Attending: STUDENT IN AN ORGANIZED HEALTH CARE EDUCATION/TRAINING PROGRAM
Payer: COMMERCIAL

## 2024-05-06 VITALS
RESPIRATION RATE: 16 BRPM | TEMPERATURE: 98.2 F | OXYGEN SATURATION: 97 % | DIASTOLIC BLOOD PRESSURE: 92 MMHG | WEIGHT: 180 LBS | HEART RATE: 72 BPM | SYSTOLIC BLOOD PRESSURE: 134 MMHG | BODY MASS INDEX: 226.8 KG/M2

## 2024-05-06 DIAGNOSIS — M54.9 CHRONIC BILATERAL BACK PAIN, UNSPECIFIED BACK LOCATION: ICD-10-CM

## 2024-05-06 DIAGNOSIS — R10.13 EPIGASTRIC PAIN: ICD-10-CM

## 2024-05-06 DIAGNOSIS — G89.29 CHRONIC BILATERAL BACK PAIN, UNSPECIFIED BACK LOCATION: ICD-10-CM

## 2024-05-06 LAB
ALBUMIN SERPL BCG-MCNC: 4.3 G/DL (ref 3.5–5.2)
ALBUMIN UR-MCNC: 100 MG/DL
ALP SERPL-CCNC: 122 U/L (ref 40–150)
ALT SERPL W P-5'-P-CCNC: 57 U/L (ref 0–50)
AMORPH CRY #/AREA URNS HPF: ABNORMAL /HPF
ANION GAP SERPL CALCULATED.3IONS-SCNC: 11 MMOL/L (ref 7–15)
APPEARANCE UR: ABNORMAL
AST SERPL W P-5'-P-CCNC: 59 U/L (ref 0–45)
ATRIAL RATE - MUSE: 76 BPM
BACTERIA #/AREA URNS HPF: ABNORMAL /HPF
BASOPHILS # BLD AUTO: 0 10E3/UL (ref 0–0.2)
BASOPHILS NFR BLD AUTO: 0 %
BILIRUB SERPL-MCNC: 0.3 MG/DL
BILIRUB UR QL STRIP: NEGATIVE
BUN SERPL-MCNC: 16 MG/DL (ref 8–23)
CALCIUM SERPL-MCNC: 9 MG/DL (ref 8.8–10.2)
CHLORIDE SERPL-SCNC: 100 MMOL/L (ref 98–107)
COLOR UR AUTO: ABNORMAL
CREAT SERPL-MCNC: 0.78 MG/DL (ref 0.51–0.95)
DEPRECATED HCO3 PLAS-SCNC: 25 MMOL/L (ref 22–29)
DIASTOLIC BLOOD PRESSURE - MUSE: NORMAL MMHG
EGFRCR SERPLBLD CKD-EPI 2021: 84 ML/MIN/1.73M2
EOSINOPHIL # BLD AUTO: 0 10E3/UL (ref 0–0.7)
EOSINOPHIL NFR BLD AUTO: 1 %
ERYTHROCYTE [DISTWIDTH] IN BLOOD BY AUTOMATED COUNT: 13.2 % (ref 10–15)
GLUCOSE SERPL-MCNC: 279 MG/DL (ref 70–99)
GLUCOSE UR STRIP-MCNC: 50 MG/DL
HCT VFR BLD AUTO: 37 % (ref 35–47)
HGB BLD-MCNC: 11.9 G/DL (ref 11.7–15.7)
HGB UR QL STRIP: ABNORMAL
HYALINE CASTS: 45 /LPF
IMM GRANULOCYTES # BLD: 0 10E3/UL
IMM GRANULOCYTES NFR BLD: 0 %
INTERPRETATION ECG - MUSE: NORMAL
KETONES UR STRIP-MCNC: NEGATIVE MG/DL
LEUKOCYTE ESTERASE UR QL STRIP: NEGATIVE
LIPASE SERPL-CCNC: 37 U/L (ref 13–60)
LYMPHOCYTES # BLD AUTO: 1.1 10E3/UL (ref 0.8–5.3)
LYMPHOCYTES NFR BLD AUTO: 37 %
MAGNESIUM SERPL-MCNC: 2.1 MG/DL (ref 1.7–2.3)
MCH RBC QN AUTO: 26.4 PG (ref 26.5–33)
MCHC RBC AUTO-ENTMCNC: 32.2 G/DL (ref 31.5–36.5)
MCV RBC AUTO: 82 FL (ref 78–100)
MONOCYTES # BLD AUTO: 0.4 10E3/UL (ref 0–1.3)
MONOCYTES NFR BLD AUTO: 15 %
MUCOUS THREADS #/AREA URNS LPF: PRESENT /LPF
NEUTROPHILS # BLD AUTO: 1.3 10E3/UL (ref 1.6–8.3)
NEUTROPHILS NFR BLD AUTO: 47 %
NITRATE UR QL: NEGATIVE
NRBC # BLD AUTO: 0 10E3/UL
NRBC BLD AUTO-RTO: 0 /100
P AXIS - MUSE: 22 DEGREES
PH UR STRIP: 5.5 [PH] (ref 5–7)
PHOSPHATE SERPL-MCNC: 4 MG/DL (ref 2.5–4.5)
PLATELET # BLD AUTO: 214 10E3/UL (ref 150–450)
POTASSIUM SERPL-SCNC: 4 MMOL/L (ref 3.4–5.3)
PR INTERVAL - MUSE: 148 MS
PROT SERPL-MCNC: 7 G/DL (ref 6.4–8.3)
QRS DURATION - MUSE: 94 MS
QT - MUSE: 408 MS
QTC - MUSE: 459 MS
R AXIS - MUSE: -47 DEGREES
RBC # BLD AUTO: 4.5 10E6/UL (ref 3.8–5.2)
RBC URINE: 0 /HPF
SODIUM SERPL-SCNC: 136 MMOL/L (ref 135–145)
SP GR UR STRIP: 1.03 (ref 1–1.03)
SQUAMOUS EPITHELIAL: 5 /HPF
SYSTOLIC BLOOD PRESSURE - MUSE: NORMAL MMHG
T AXIS - MUSE: 30 DEGREES
UROBILINOGEN UR STRIP-MCNC: 2 MG/DL
VENTRICULAR RATE- MUSE: 76 BPM
WBC # BLD AUTO: 2.9 10E3/UL (ref 4–11)
WBC URINE: 9 /HPF

## 2024-05-06 PROCEDURE — 83735 ASSAY OF MAGNESIUM: CPT | Performed by: STUDENT IN AN ORGANIZED HEALTH CARE EDUCATION/TRAINING PROGRAM

## 2024-05-06 PROCEDURE — 258N000003 HC RX IP 258 OP 636: Performed by: STUDENT IN AN ORGANIZED HEALTH CARE EDUCATION/TRAINING PROGRAM

## 2024-05-06 PROCEDURE — 84100 ASSAY OF PHOSPHORUS: CPT | Performed by: STUDENT IN AN ORGANIZED HEALTH CARE EDUCATION/TRAINING PROGRAM

## 2024-05-06 PROCEDURE — 93005 ELECTROCARDIOGRAM TRACING: CPT | Performed by: STUDENT IN AN ORGANIZED HEALTH CARE EDUCATION/TRAINING PROGRAM

## 2024-05-06 PROCEDURE — 93010 ELECTROCARDIOGRAM REPORT: CPT | Performed by: STUDENT IN AN ORGANIZED HEALTH CARE EDUCATION/TRAINING PROGRAM

## 2024-05-06 PROCEDURE — 81001 URINALYSIS AUTO W/SCOPE: CPT | Performed by: STUDENT IN AN ORGANIZED HEALTH CARE EDUCATION/TRAINING PROGRAM

## 2024-05-06 PROCEDURE — 85004 AUTOMATED DIFF WBC COUNT: CPT | Performed by: STUDENT IN AN ORGANIZED HEALTH CARE EDUCATION/TRAINING PROGRAM

## 2024-05-06 PROCEDURE — 83690 ASSAY OF LIPASE: CPT | Performed by: STUDENT IN AN ORGANIZED HEALTH CARE EDUCATION/TRAINING PROGRAM

## 2024-05-06 PROCEDURE — 250N000013 HC RX MED GY IP 250 OP 250 PS 637: Performed by: STUDENT IN AN ORGANIZED HEALTH CARE EDUCATION/TRAINING PROGRAM

## 2024-05-06 PROCEDURE — 36415 COLL VENOUS BLD VENIPUNCTURE: CPT | Performed by: STUDENT IN AN ORGANIZED HEALTH CARE EDUCATION/TRAINING PROGRAM

## 2024-05-06 PROCEDURE — 96360 HYDRATION IV INFUSION INIT: CPT

## 2024-05-06 PROCEDURE — 99285 EMERGENCY DEPT VISIT HI MDM: CPT | Mod: 25 | Performed by: STUDENT IN AN ORGANIZED HEALTH CARE EDUCATION/TRAINING PROGRAM

## 2024-05-06 PROCEDURE — 76705 ECHO EXAM OF ABDOMEN: CPT

## 2024-05-06 PROCEDURE — 82247 BILIRUBIN TOTAL: CPT | Performed by: STUDENT IN AN ORGANIZED HEALTH CARE EDUCATION/TRAINING PROGRAM

## 2024-05-06 RX ORDER — ACETAMINOPHEN 325 MG/1
975 TABLET ORAL ONCE
Status: COMPLETED | OUTPATIENT
Start: 2024-05-06 | End: 2024-05-06

## 2024-05-06 RX ORDER — NICOTINE POLACRILEX 4 MG/1
40 GUM, CHEWING ORAL DAILY
Qty: 60 TABLET | Refills: 0 | Status: SHIPPED | OUTPATIENT
Start: 2024-05-06 | End: 2024-06-05

## 2024-05-06 RX ORDER — MAGNESIUM HYDROXIDE/ALUMINUM HYDROXICE/SIMETHICONE 120; 1200; 1200 MG/30ML; MG/30ML; MG/30ML
30 SUSPENSION ORAL ONCE
Status: COMPLETED | OUTPATIENT
Start: 2024-05-06 | End: 2024-05-06

## 2024-05-06 RX ADMIN — SODIUM CHLORIDE, POTASSIUM CHLORIDE, SODIUM LACTATE AND CALCIUM CHLORIDE 1000 ML: 600; 310; 30; 20 INJECTION, SOLUTION INTRAVENOUS at 09:15

## 2024-05-06 RX ADMIN — ACETAMINOPHEN 975 MG: 325 TABLET, FILM COATED ORAL at 09:02

## 2024-05-06 RX ADMIN — ALUMINUM HYDROXIDE, MAGNESIUM HYDROXIDE, AND SIMETHICONE 30 ML: 1200; 120; 1200 SUSPENSION ORAL at 09:54

## 2024-05-06 ASSESSMENT — ACTIVITIES OF DAILY LIVING (ADL)
ADLS_ACUITY_SCORE: 35
ADLS_ACUITY_SCORE: 33

## 2024-05-06 ASSESSMENT — COLUMBIA-SUICIDE SEVERITY RATING SCALE - C-SSRS
6. HAVE YOU EVER DONE ANYTHING, STARTED TO DO ANYTHING, OR PREPARED TO DO ANYTHING TO END YOUR LIFE?: NO
2. HAVE YOU ACTUALLY HAD ANY THOUGHTS OF KILLING YOURSELF IN THE PAST MONTH?: NO
1. IN THE PAST MONTH, HAVE YOU WISHED YOU WERE DEAD OR WISHED YOU COULD GO TO SLEEP AND NOT WAKE UP?: NO

## 2024-05-06 NOTE — ED NOTES
States she is just taking just cold water.  States that is what she was told to do by MD.  Denies and vaginal bleeding/or discharge.  Denies any urinary problems.  Abd pain is not constant but came today because it is really bad.  Pain 6/10   Abd pain is epigastric mostly.  States she had left shoulder pain and she used cream and it went away.  Shoulder pain has been for 6 months.  States she use to have acid reflux but not anymore.  Taking meds for diabetes and thyroid. No changes with medications.  Pain goes down her legs states she has cramps in both legs.  States tingling and pain goes down to toes.  Able to walk.  Denies problem with breathing.

## 2024-05-06 NOTE — DISCHARGE INSTRUCTIONS
You are seen in the emergency department due to abdominal pain, and back pain as well as muscle spasms and cramps in your legs.    You are given medication which helped with your symptoms, and otherwise had lab work and an ultrasound all of which were reassuring.  The cause of your symptoms at this time is unclear, but could be related to gastritis.  Your back pain and muscle cramps are consistent with your ongoing pain and cramps you have had for years, and at this point in time no life-threatening causes were found.  We would recommend that you follow-up with your primary care doctor ongoing however.

## 2024-05-06 NOTE — ED TRIAGE NOTES
Ate fish and beans on Saturday, since then, has had 1 episode of diarrhea, not hungry, having stomach cramps, cramps in legs and back as well. Denies any fevers. Pt states she has been unable to sleep at night due to the pain.      Triage Assessment (Adult)       Row Name 05/06/24 0803          Triage Assessment    Airway WDL WDL        Respiratory WDL    Respiratory WDL WDL        Skin Circulation/Temperature WDL    Skin Circulation/Temperature WDL WDL        Cardiac WDL    Cardiac WDL WDL        Peripheral/Neurovascular WDL    Peripheral Neurovascular WDL WDL        Cognitive/Neuro/Behavioral WDL    Cognitive/Neuro/Behavioral WDL WDL

## 2024-05-06 NOTE — ED PROVIDER NOTES
ED Provider Note  [facility]      History     Chief Complaint   Patient presents with    Abdominal Pain     Ate fish and beans on Saturday, since then, has had 1 episode of diarrhea, not hungry, having stomach cramps, cramps in legs and back as well. Denies any fevers.      HPI  Melba Rivera is a 63 year old female with pmh T2DM and subclinical hypothyroidism who presents with abdominal pain. She is interviewed with assistance of . Says that she ate some fish on Friday and stated feeling stomach upset shortly after. Had one bout of diarrhea. Denies vomiting or nausea. Had normal bowel movement this morning. Reports pain is 6/10 that radiates from epigastric area toward back. Has felt generally weak and has experienced cramping pain in both legs. Able to ambulate without difficulty. Says pain in legs is worse when she is lying down. Has had no appetite but has been drinking water. Has chronic L shoulder pain for which she uses topical pain relief cream. Had acid reflux in past but denies current treatment for GERD. No recent changes in diabetes medication. Denies fever. No recent viral illnesses or sick contacts.     Past Medical History  History reviewed. No pertinent past medical history.  Past Surgical History:   Procedure Laterality Date    GYN SURGERY       17 years ago     omeprazole 20 MG tablet  ATORVASTATIN CALCIUM PO  calcium carbonate (OS-CASSANDRA 500 MG [place]. [place]) 1250 MG tablet  glipiZIDE (GLUCOTROL XL) 10 MG 24 hr tablet  levothyroxine (SYNTHROID/LEVOTHROID) 50 MCG tablet  METFORMIN HCL PO      No Known Allergies  Family History  History reviewed. No pertinent family history.  Social History   Social History     Tobacco Use    Smoking status: Never    Smokeless tobacco: Never   Substance Use Topics    Alcohol use: No    Drug use: No         A medically appropriate review of systems was performed with pertinent positives and negatives noted in the HPI,  and all other systems negative.    Physical Exam   BP: 128/89  Pulse: 85  Temp: 97.9  F (36.6  C)  Resp: 16  Weight: 81.6 kg (180 lb)  SpO2: 99 %  Physical Exam  Vital Signs Reviewed  Gen: Well nourished, well developed, resting comfortably, no acute distress  HEENT: NC/AT,  EOMI  CV: RRR, NMRG  Lungs/Chest: Normal Effort, CTAB  Abd: Non-distended, tender to palpation epigastric region, no rebound or guarding  Neuro: A&Ox3, CN II-XII unremarkable. Strength 5/5 bilateral LE and sensation to light touch globally intact.  Skin: Warm, Dry, Intact, no visible lesions     ED Course, Procedures, & Data     ED Course as of 05/09/24 0058   Mon May 06, 2024   1057 US Abdomen Limited (RUQ)     Procedures            EKG Interpretation:      Interpreted by GENIE HERRERA MD  Time reviewed: 9:30  Symptoms at time of EKG: see HPI   Rhythm: normal sinus   Rate: normal  Axis: normal  Ectopy: none  Conduction: normal  ST Segments/ T Waves: No ST-T wave changes  Q Waves: none  Comparison to prior: Unchanged    Clinical Impression: normal EKG          Results for orders placed or performed during the hospital encounter of 05/06/24   US Abdomen Limited (RUQ)     Status: None    Narrative    US ABDOMEN LIMITED 5/6/2024 11:06 AM    CLINICAL HISTORY: abdominal pain, transaminitis  TECHNIQUE: Limited abdominal ultrasound.    COMPARISON: 12/13/2021    FINDINGS:    GALLBLADDER: The gallbladder is normal. No gallstones, wall  thickening, or pericholecystic fluid. Negative sonographic Guzman's  sign.    BILE DUCTS: There is no biliary dilatation. The common duct measures  4mm.    LIVER: Hepatic steatosis. No focal hepatic masses..    RIGHT KIDNEY: No hydronephrosis.    PANCREAS: The visualized portions of the pancreas are normal.    No ascites.      Impression    IMPRESSION:  1.  Hepatic steatosis.    MALU MEDRANO MD         SYSTEM ID:  [id number]   UA with Microscopic reflex to Culture     Status: Abnormal    Specimen: Urine, Clean Catch    Result Value Ref Range    Color Urine Orange (A) Colorless, Straw, Light Yellow, Yellow    Appearance Urine Cloudy (A) Clear    Glucose Urine 50 (A) Negative mg/dL    Bilirubin Urine Negative Negative    Ketones Urine Negative Negative mg/dL    Specific Gravity Urine 1.034 1.003 - 1.035    Blood Urine Small (A) Negative    pH Urine 5.5 5.0 - 7.0    Protein Albumin Urine 100 (A) Negative mg/dL    Urobilinogen Urine 2.0 Normal, 2.0 mg/dL    Nitrite Urine Negative Negative    Leukocyte Esterase Urine Negative Negative    Bacteria Urine Few (A) None Seen /HPF    Mucus Urine Present (A) None Seen /LPF    Amorphous Crystals Urine Few (A) None Seen /HPF    RBC Urine 0 <=2 /HPF    WBC Urine 9 (H) <=5 /HPF    Squamous Epithelials Urine 5 (H) <=1 /HPF    Hyaline Casts Urine 45 (H) <=2 /LPF    Narrative    Urine Culture not indicated   Comprehensive metabolic panel     Status: Abnormal   Result Value Ref Range    Sodium 136 135 - 145 mmol/L    Potassium 4.0 3.4 - 5.3 mmol/L    Carbon Dioxide (CO2) 25 22 - 29 mmol/L    Anion Gap 11 7 - 15 mmol/L    Urea Nitrogen 16.0 8.0 - 23.0 mg/dL    Creatinine 0.78 0.51 - 0.95 mg/dL    GFR Estimate 84 >60 mL/min/1.73m2    Calcium 9.0 8.8 - 10.2 mg/dL    Chloride 100 98 - 107 mmol/L    Glucose 279 (H) 70 - 99 mg/dL    Alkaline Phosphatase 122 40 - 150 U/L    AST 59 (H) 0 - 45 U/L    ALT 57 (H) 0 - 50 U/L    Protein Total 7.0 6.4 - 8.3 g/dL    Albumin 4.3 3.5 - 5.2 g/dL    Bilirubin Total 0.3 <=1.2 mg/dL   Lipase     Status: Normal   Result Value Ref Range    Lipase 37 13 - 60 U/L   CBC with platelets and differential     Status: Abnormal   Result Value Ref Range    WBC Count 2.9 (L) 4.0 - 11.0 10e3/uL    RBC Count 4.50 3.80 - 5.20 10e6/uL    Hemoglobin 11.9 11.7 - 15.7 g/dL    Hematocrit 37.0 35.0 - 47.0 %    MCV 82 78 - 100 fL    MCH 26.4 (L) 26.5 - 33.0 pg    MCHC 32.2 31.5 - 36.5 g/dL    RDW 13.2 10.0 - 15.0 %    Platelet Count 214 150 - 450 10e3/uL    % Neutrophils 47 %    %  Lymphocytes 37 %    % Monocytes 15 %    % Eosinophils 1 %    % Basophils 0 %    % Immature Granulocytes 0 %    NRBCs per 100 WBC 0 <1 /100    Absolute Neutrophils 1.3 (L) 1.6 - 8.3 10e3/uL    Absolute Lymphocytes 1.1 0.8 - 5.3 10e3/uL    Absolute Monocytes 0.4 0.0 - 1.3 10e3/uL    Absolute Eosinophils 0.0 0.0 - 0.7 10e3/uL    Absolute Basophils 0.0 0.0 - 0.2 10e3/uL    Absolute Immature Granulocytes 0.0 <=0.4 10e3/uL    Absolute NRBCs 0.0 10e3/uL   Magnesium     Status: Normal   Result Value Ref Range    Magnesium 2.1 1.7 - 2.3 mg/dL   Phosphorus     Status: Normal   Result Value Ref Range    Phosphorus 4.0 2.5 - 4.5 mg/dL   EKG 12-lead, tracing only     Status: None   Result Value Ref Range    Systolic Blood Pressure  mmHg    Diastolic Blood Pressure  mmHg    Ventricular Rate 76 BPM    Atrial Rate 76 BPM    VA Interval 148 ms    QRS Duration 94 ms     ms    QTc 459 ms    P Axis 22 degrees    R AXIS -47 degrees    T Axis 30 degrees    Interpretation ECG       Sinus rhythm  Left anterior fascicular block  Abnormal ECG  Unconfirmed report - interpretation of this ECG is computer generated - see medical record for final interpretation  Confirmed by - EMERGENCY ROOM, PHYSICIAN ([id number]),  TORI RASHID ([id number]) on 5/6/2024 5:56:03 PM     CBC with platelets differential     Status: Abnormal    Narrative    The following orders were created for panel order CBC with platelets differential.  Procedure                               Abnormality         Status                     ---------                               -----------         ------                     CBC with platelets and d...[[id number]]  Abnormal            Final result                 Please view results for these tests on the individual orders.     Medications   lactated ringers BOLUS 1,000 mL (0 mLs Intravenous Stopped 5/6/24 1040)   acetaminophen (TYLENOL) tablet 975 mg (975 mg Oral $Given 5/6/24 0902)   alum & mag hydroxide-simethicone (MAALOX)  suspension 30 mL (30 mLs Oral $Given 5/6/24 4757)     Labs Ordered and Resulted from Time of ED Arrival to Time of ED Departure   ROUTINE UA WITH MICROSCOPIC REFLEX TO CULTURE - Abnormal       Result Value    Color Urine Orange (*)     Appearance Urine Cloudy (*)     Glucose Urine 50 (*)     Bilirubin Urine Negative      Ketones Urine Negative      Specific Gravity Urine 1.034      Blood Urine Small (*)     pH Urine 5.5      Protein Albumin Urine 100 (*)     Urobilinogen Urine 2.0      Nitrite Urine Negative      Leukocyte Esterase Urine Negative      Bacteria Urine Few (*)     Mucus Urine Present (*)     Amorphous Crystals Urine Few (*)     RBC Urine 0      WBC Urine 9 (*)     Squamous Epithelials Urine 5 (*)     Hyaline Casts Urine 45 (*)    COMPREHENSIVE METABOLIC PANEL - Abnormal    Sodium 136      Potassium 4.0      Carbon Dioxide (CO2) 25      Anion Gap 11      Urea Nitrogen 16.0      Creatinine 0.78      GFR Estimate 84      Calcium 9.0      Chloride 100      Glucose 279 (*)     Alkaline Phosphatase 122      AST 59 (*)     ALT 57 (*)     Protein Total 7.0      Albumin 4.3      Bilirubin Total 0.3     CBC WITH PLATELETS AND DIFFERENTIAL - Abnormal    WBC Count 2.9 (*)     RBC Count 4.50      Hemoglobin 11.9      Hematocrit 37.0      MCV 82      MCH 26.4 (*)     MCHC 32.2      RDW 13.2      Platelet Count 214      % Neutrophils 47      % Lymphocytes 37      % Monocytes 15      % Eosinophils 1      % Basophils 0      % Immature Granulocytes 0      NRBCs per 100 WBC 0      Absolute Neutrophils 1.3 (*)     Absolute Lymphocytes 1.1      Absolute Monocytes 0.4      Absolute Eosinophils 0.0      Absolute Basophils 0.0      Absolute Immature Granulocytes 0.0      Absolute NRBCs 0.0     LIPASE - Normal    Lipase 37     MAGNESIUM - Normal    Magnesium 2.1     PHOSPHORUS - Normal    Phosphorus 4.0       US Abdomen Limited (RUQ)   Final Result   IMPRESSION:   1.  Hepatic steatosis.      MALU MEDRANO MD            SYSTEM  ID:  [id number]             Critical care was not performed.     Medical Decision Making  The patient's presentation was of high complexity (a chronic illness severe exacerbation, progression, or side effect of treatment).    The patient's evaluation involved:  review of external note(s) from 3+ sources (see separate area of note for details)  review of 3+ test result(s) ordered prior to this encounter (see separate area of note for details)  ordering and/or review of 3+ test(s) in this encounter (see separate area of note for details)    The patient's management necessitated moderate risk (prescription drug management including medications given in the ED) and high risk (a decision regarding hospitalization).    Assessment & Plan      Melba Rivera is a 63 year old female with pmh T2DM and subclinical hypothyroidism who presents with abdominal pain. Symptoms consistent with gastritis vs GERD vs food poisoning. Other considerations include ACS, pancreatitis, gallbladder pathology.  EKG wnl. Lipase wnl. WBC low 2.9, afebrile, UA negative for signs of infection. Casts on UA likely representing dehydration. CMP shows mild elevation AST 59, ALT 57. No cholestatic pattern as alk phos is wnl and no clinical correlation on abdominal exam. RUQ ultrasound showed normal gallbladder and bile ducts, hepatic steatosis present. Additionally pt complains of lower back pain which appears to be chronic. Education on symptomatic treatment of musculoskeletal pain given. Pt symptoms resolved after treatment with IVF and maalox. Plan to discharge home with increased dose of PPI and follow up with primary care.      I have reviewed the nursing notes. I have reviewed the findings, diagnosis, plan and need for follow up with the patient.    Discharge Medication List as of 5/6/2024 11:59 AM          Final diagnoses:   Chronic bilateral back pain, unspecified back location   Epigastric pain     Sabrina Lau MD MPH  PGY1 Psychiatry  resident      --    ED Attending Physician Attestation    I Rebeca Jade MD, cared for this patient with the Resident. I have performed a history and physical examination of the patient and discussed management with the resident. I reviewed the resident's documentation above and agree with the documented findings and plan of care.    Summary of HPI, PE, ED Course   Patient is a 63 year old female evaluated in the emergency department for abdominal pain that is been worse over throughout the day in the setting of eating fish and having increasingly upset stomach.  Single episode of diarrhea, as well as some pain in her back that radiates into her legs with with cramping.  Patient states that she has been having this pain in her back and cramping in her legs for the last number of years.  This abdominal pain also comes and goes. Exam and ED course notable for mild epigastric abdominal tenderness, no focal neurological deficits, some bilateral paraspinal back pain.    Course overall reassuring.  After labs, x-ray,And ultrasound, no acute process was found.  She felt back to her baseline after a dose of Maalox.  Was discharged with omeprazole increased. After the completion of care in the emergency department, the patient was discharged.      Rebeca Jade MD  Emergency Medicine     Rebeca Jade MD  [facility] EMERGENCY DEPARTMENT  5/6/2024     Rebeca Jade MD  05/09/24 0059

## 2024-09-21 ENCOUNTER — HEALTH MAINTENANCE LETTER (OUTPATIENT)
Age: 64
End: 2024-09-21